# Patient Record
Sex: FEMALE
[De-identification: names, ages, dates, MRNs, and addresses within clinical notes are randomized per-mention and may not be internally consistent; named-entity substitution may affect disease eponyms.]

---

## 2018-03-01 ENCOUNTER — HOSPITAL ENCOUNTER (INPATIENT)
Dept: HOSPITAL 92 - ERS | Age: 63
LOS: 1 days | Discharge: HOME | DRG: 69 | End: 2018-03-02
Attending: FAMILY MEDICINE | Admitting: FAMILY MEDICINE
Payer: SELF-PAY

## 2018-03-01 VITALS — BODY MASS INDEX: 34.6 KG/M2

## 2018-03-01 DIAGNOSIS — E11.65: ICD-10-CM

## 2018-03-01 DIAGNOSIS — I10: ICD-10-CM

## 2018-03-01 DIAGNOSIS — G45.9: Primary | ICD-10-CM

## 2018-03-01 DIAGNOSIS — N39.0: ICD-10-CM

## 2018-03-01 DIAGNOSIS — J45.909: ICD-10-CM

## 2018-03-01 DIAGNOSIS — I69.354: ICD-10-CM

## 2018-03-01 PROCEDURE — 80048 BASIC METABOLIC PNL TOTAL CA: CPT

## 2018-03-01 PROCEDURE — 83036 HEMOGLOBIN GLYCOSYLATED A1C: CPT

## 2018-03-01 PROCEDURE — 36416 COLLJ CAPILLARY BLOOD SPEC: CPT

## 2018-03-01 PROCEDURE — 83605 ASSAY OF LACTIC ACID: CPT

## 2018-03-01 PROCEDURE — 36415 COLL VENOUS BLD VENIPUNCTURE: CPT

## 2018-03-01 PROCEDURE — 96365 THER/PROPH/DIAG IV INF INIT: CPT

## 2018-03-01 PROCEDURE — 80061 LIPID PANEL: CPT

## 2018-03-01 PROCEDURE — 93880 EXTRACRANIAL BILAT STUDY: CPT

## 2018-03-01 PROCEDURE — 85025 COMPLETE CBC W/AUTO DIFF WBC: CPT

## 2018-03-01 PROCEDURE — A4216 STERILE WATER/SALINE, 10 ML: HCPCS

## 2018-03-01 PROCEDURE — 70551 MRI BRAIN STEM W/O DYE: CPT

## 2018-03-01 RX ADMIN — Medication SCH ML: at 09:53

## 2018-03-01 RX ADMIN — Medication SCH ML: at 21:13

## 2018-03-01 NOTE — ULT
CAROTID ULTRASOUND WITH GRAY SCALE AND DOPPLER DUPLEX COLOR FLOW IMAGING

SPECTRAL ANALYSIS PERFORMED:

 

CLINICAL INDICATION: 

TIA.

 

FINDINGS: 

There is mild intimal thickening atherosclerotic calcification of the carotid arteries.

 

PEAK SYSTOLIC VELOCITY (CM/S):

Right CCA         75

Left CCA          93

Right ICA         86

Left ICA          59

 

There is antegrade flow within the visualized bilateral vertebral arteries.

 

IMPRESSION: 

1.  No hemodynamically significant stenosis of the right internal carotid artery.

2.  No hemodynamically significant stenosis of the left internal carotid artery.

 

POS: DANIKA

## 2018-03-01 NOTE — MRI
BRAIN MRI NONCONTRAST

3/1/18

 

CLINICAL HISTORY: 

TIA, CVA with left sided weakness. 

 

FINDINGS:  

There is no evidence of acute territorial infarction, mass effect or midline shift. Ventricular syste
m is within normal limits of size. There is mild chronic microvascular ischemic disease. No significa
nt intracranial hemorrhagic susceptibility. Imaged skull base flow voids are patent. There is opacifi
cation of the imaged maxillary sinuses. Partially empty sella is present. 

 

IMPRESSION:  

1.      No acute territorial infarction or mass effect. 

2.      Mild chronic microvascular ischemic disease. 

 

POS: DANIKA

## 2018-03-01 NOTE — PDOC.FPRHP
- History of Present Illness


Chief Complaint: Feels bad and worried about a stroke 


History of Present Illness: 





63 yo F with hx of HTN, DM, and CVA with persistent left sided weakness 

presented to [facility] ER with concern of "feeling bad and maybe had a stroke." 

She states that she started to feel bad and have a left frontal headache 

sometime in the afternoon of 2/28. She had associated left sided facial weakness

, drooling, and left UE weakness. It was at this point that she was concerned 

that she may be stroking. She then called her ex  who took her out to 

eat so that she might feel better. After returning home, her worry of stroke 

persisted and she decided to come into the ER.





She has an additional hx of DM, for which she takes metformin and glipizide and 

should be on insulin, however is not. 





This patient is a poor historian and typically answers questions tangential to 

what was asked. 


ED Course: 





In the ED she had a head CT that was negative. Additionally, she had a UA that 

was concerning for UTI. She was given a gram of Rocpehin and sent to this 

facility dt concern of TIA. 





- Allergies/Adverse Reactions


 Allergies











Allergy/AdvReac Type Severity Reaction Status Date / Time


 


codeine Allergy   Verified 03/01/18 04:47


 


iodine Allergy   Verified 03/01/18 04:48














- Home Medications


 











 Medication  Instructions  Recorded  Confirmed  Type


 


Amlodipine [Norvasc] 1 tab PO DAILY 03/01/18 03/01/18 History


 


Aspirin [Ecotrin Low Strength] 1 tab PO DAILY 03/01/18 03/01/18 History


 


glipiZIDE [Glipizide] 2 tab PO DAILY 03/01/18 03/01/18 History


 


metFORMIN [Glucophage] 1 tab PO BID 03/01/18 03/01/18 History














- History


PMHx:


CVA


HTN


DM2


Asthma


 


PSHx: 


Partial hysterectomy 2/2 cervical cancer 





FHx:


 


Social:


 








- Review of Systems


General: denies: fever/chills, weight/appetite/sleep changes


Eyes: denies: eye pain, vision changes


ENT: denies: nasal congestion


Respiratory: denies: cough, congestion, shortness of breath


Cardiovascular: denies: chest pain, palpitation


Gastrointestinal: denies: nausea, vomiting


Genitourinary: denies: incontinence, dysuria


Skin: denies: rashes, lesions


Musculoskeletal: denies: pain, tenderness, stiffness


Neurological: reports: numbness (Left face), weakness (Left arm), other (left 

frontal headache)


Psychological: denies: anxiety, depression





- Vital signs


BP: 173/93  HR: 82 RR: 20 Tmax: 97.9 Pox: 96% on RA  Wt: 93 kg   








- Physical Exam


Constitutional: NAD, awake, alert and oriented, well developed


HEENT: normocephalic and atraumatic, PERRLA, EOMI


Neck: supple, FROM


Chest: no-tender to palpation


Heart: RRR, normal S1/S2, no murmurs/rubs/gallops


Lungs: CTAB, no respiratory distress, no wheezing


Abdomen: soft, non-tender, bowel sounds present


Musculoskeletal: normal structure, normal tone


Neurological: CN II-XII intact, other (L UE  strength, wrist extension, arm 

flexion all 3/5)


Skin: no rash/lesions, good turgor


Heme/Lymphatic: no unusual bruising or bleeding


Psychiatric: normal mood and affect, intact recent and remote memory





FMR H&P: Results





- Labs


Lab results: 





Per [facility] records


Lactic Aci d2.3


UA Nit pos, LE post, WBC 20-50, Puneet many, squam 0-5





CBC Hb 14.3, Hct 41, WBC 7.6, Plt 221





FMR H&P: A/P





- Problem List


(1) TIA (transient ischemic attack)


Current Visit: Yes   Status: Suspected   Priority: High   





(2) HTN (hypertension)


Current Visit: Yes   Status: Chronic   Priority: Medium   Code(s): I10 - 

ESSENTIAL (PRIMARY) HYPERTENSION   


Qualifiers: 


   Hypertension type: essential hypertension   Qualified Code(s): I10 - 

Essential (primary) hypertension   





(3) DM2 (diabetes mellitus, type 2)


Current Visit: Yes   Status: Chronic   Priority: Medium   





(4) UTI (urinary tract infection)


Current Visit: Yes   Status: Acute   Priority: High   


Qualifiers: 


   Urinary tract infection type: acute cystitis   Hematuria presence: without 

hematuria   Qualified Code(s): N30.00 - Acute cystitis without hematuria   





- Plan





Possible TIA


- CT neg, however in light of hx will order MRI today 


- Carotid doppler 


- Continue ASA, pt unable to tolerate statin per hx


- Admit to Stroke, monitor NIH





UTI


- Continue Rocephin 


- Call [facility] for Cx resutls


- This is likely 2/2 elevated BG





DM2


- continue home meds


- SSI


- BG ACHS


- A1c


- Low carb diet





HTN


- continue home meds, pt states that at home she is typically controlled and 

reports BP 120s/80s





Asthma


- possible dx. Pt did not mention it and it is not in her meds from home, 

however it was in her hx from [facility] ED


- monitor O2 sats. PRN albuterol





DVT PPx


- SCD





Diet 


- Heart healthy, low carb














Disposition/LOS: 





Pt is stable. Continue to treat UTI and work up possible TIA/CVA. Likely 

discharge in 24 hours. 





FMR H&P: Upper Level





- Pertinent history





63yo F with PMHx of T2DM, HTN, hx of CVA (2017) and hx of AAA who presented to 

[facility] ED due to LT sided facial numbness, drooling, difficulty finding words 

and not feeling well. Transferred here for further evaluation. Patient reports 

she had sudden onset of LT frontal HA, LT facial numbness and drooling around 

3pm. Called her ex- at that time and decided to go out and eat dinner as 

she had just not been feeling well for the past 2wks. Went home and continued 

to have symptoms and thus presented to ED. States symptoms resolved when she 

got medicine at the ED. Only received insulin and rocephin at OSH ED. Patient 

is a very poor historian. Reports 2wk hx of increased urinary frequency and 

urinary pressure. Denies any fevers, chills, N/V, diarrhea. Of note, patient 

has a hx of CVA last year with LT sided deficits. Continues to have residual LT 

UE/LE weakness but currently states her LUE feels stiffer than her baseline. 





- Pertinent findings


Gen: NAD


HEENT: PERRLA


Heart: S1, S2, RRR


Lungs: CTAB


Abd: soft, +suprapubic tendnerness, BS+


Neuro: CN2-12 intact, RUE/RLE 5/5 strength, LUE/LLE 3/5 strength, no drift, 

sensation intact, normal FNF





- Plan


Date/Time: 03/01/18 0705








Orly NELSON, have evaluated this patient and agree with findings/

plan as outlined by intern resident. Pertinent changes/additions are listed 

here.





1. TIA vs. CVA: Admit to stroke. Cont ASA. Previously on statin but stopped 

because it shut down her organs. Obtain FLP, A1c, carotid dopplers and MRI. 

Allow for permissive HTN for 24hrs. ABCD2 score of 6 indicative of high 2d 

stroke. 


2. Elevated lactate: Patient with elevated lactate of 2.3 at OSH and given 1L 

bolus at that time. Cont IVF and repeat lactate. 


3. UTI: Given Rocephin at OSH. Cont. Urine and bl cxs obtained at OSH. 


4. Hyperglycemia: likely 2/2 uncontrolled T2DM. Given insulin at OSH. Obtain 

A1c. Currently on metformin and glipizide though not compliant. She recently 

got meds filled a few days ago, but prior had not been taking them for the past 

2wks because of financial costs. Cont home regimen.


5. HTN: hold home amlodipine. 


6. Hx of CVA with LT sided residual deficits (2017)


7. Hx of AAA


8. Diet: HH


9. PPx: SCDs


10. Code Status: Full 





Attending Addendum





- Attending Addendum


Date/Time: 03/01/18 1221





I personally evaluated the patient and discussed the management with Dr. Roche and Dr. Polk.


I agree with the History, Examination, Assessment and Plan documented above 

with any addition or exceptions noted below.


Patient with symptoms of TIA, now resolved with exception of headache and 

malaise.  Patient notes dysarthria, unilateral weakness on the left side (worse 

than residual hemiplegia from previous stroke), and left-sided facial droop 

that are all now resolved.  ABCD2 score 6, will need to stay overnight for 

observation.  MRI, carotid dopplar results pending.  Consult neurology and 

start ASA/Plavix DAPT for 21 days followed by Plavix treatment unless another 

treatment course is preferred by neurology for secondary stroke prevention.  

Does not tolerate statin.  In addition will treat UTI with Rocephin and await 

culture results.

## 2018-03-02 VITALS — SYSTOLIC BLOOD PRESSURE: 158 MMHG | TEMPERATURE: 97.7 F | DIASTOLIC BLOOD PRESSURE: 95 MMHG

## 2018-03-02 LAB
ANION GAP SERPL CALC-SCNC: 12 MMOL/L (ref 10–20)
BASOPHILS # BLD AUTO: 0 THOU/UL (ref 0–0.2)
BASOPHILS NFR BLD AUTO: 0.2 % (ref 0–1)
BUN SERPL-MCNC: 6 MG/DL (ref 9.8–20.1)
CALCIUM SERPL-MCNC: 9.3 MG/DL (ref 7.8–10.44)
CHD RISK SERPL-RTO: 6.2 (ref ?–4.5)
CHLORIDE SERPL-SCNC: 102 MMOL/L (ref 98–107)
CHOLEST SERPL-MCNC: 210 MG/DL
CO2 SERPL-SCNC: 25 MMOL/L (ref 23–31)
CREAT CL PREDICTED SERPL C-G-VRATE: 140 ML/MIN (ref 70–130)
EOSINOPHIL # BLD AUTO: 0.2 THOU/UL (ref 0–0.7)
EOSINOPHIL NFR BLD AUTO: 2.8 % (ref 0–10)
GLUCOSE SERPL-MCNC: 219 MG/DL (ref 80–115)
HDLC SERPL-MCNC: 34 MG/DL
HGB BLD-MCNC: 13.6 G/DL (ref 12–16)
LDLC SERPL CALC-MCNC: 142 MG/DL
LYMPHOCYTES # BLD: 1.6 THOU/UL (ref 1.2–3.4)
LYMPHOCYTES NFR BLD AUTO: 23.4 % (ref 21–51)
MCH RBC QN AUTO: 28.4 PG (ref 27–31)
MCV RBC AUTO: 84 FL (ref 81–99)
MONOCYTES # BLD AUTO: 0.5 THOU/UL (ref 0.11–0.59)
MONOCYTES NFR BLD AUTO: 7.1 % (ref 0–10)
NEUTROPHILS # BLD AUTO: 4.5 THOU/UL (ref 1.4–6.5)
NEUTROPHILS NFR BLD AUTO: 66.5 % (ref 42–75)
PLATELET # BLD AUTO: 201 THOU/UL (ref 130–400)
POTASSIUM SERPL-SCNC: 3.6 MMOL/L (ref 3.5–5.1)
RBC # BLD AUTO: 4.78 MILL/UL (ref 4.2–5.4)
SODIUM SERPL-SCNC: 135 MMOL/L (ref 136–145)
TRIGL SERPL-MCNC: 168 MG/DL (ref ?–150)
WBC # BLD AUTO: 6.8 THOU/UL (ref 4.8–10.8)

## 2018-03-02 NOTE — PDOC.FM
- Subjective


Subjective: 





Patient reports she is feeling much better this morning. She states she is back 

to her baseline walking. She reports feelings like her confusion is resolved. 

She would like to go home.





- Objective


Vital Signs & Weight: 


 Vital Signs (12 hours)











  Temp Pulse Resp BP Pulse Ox


 


 03/02/18 08:00  98.3 F  92  18  


 


 03/02/18 07:18  98.3 F  92  18  141/90 H  97


 


 03/02/18 04:00  97.5 F L  80  18  165/79 H  94 L








 Weight











Weight                         97.296 kg














I&O: 


 











 03/01/18 03/02/18 03/03/18





 06:59 06:59 06:59


 


Intake Total  0 


 


Balance  0 











Result Diagrams: 


 03/02/18 05:16





 03/02/18 05:16





<Homero Polk - Last Filed: 03/02/18 08:47>





- Objective


Vital Signs & Weight: 


 Weight











Weight                         97.296 kg














I&O: 


 











 03/02/18 03/03/18 03/04/18





 06:59 06:59 06:59


 


Intake Total 0  


 


Balance 0  











Result Diagrams: 


 03/02/18 05:16





 03/02/18 05:16





<Betty Orellana - Last Filed: 03/03/18 09:18>





Phys Exam





- Physical Examination


HEENT: PERRLA, moist MMs


Neck: no nodes, full ROM


Respiratory: no wheezing, clear to auscultation bilateral


Cardiovascular: RRR, no significant murmur


Gastrointestinal: soft, non-tender


Musculoskeletal: no edema, pulses present


Neurological: non-focal, moves all 4 limbs


Psychiatric: normal affect, A&O x 3


Skin: no rash, normal turgor, cap refill <2 seconds





<Homero Polk - Last Filed: 03/02/18 08:47>





Dx/Plan





- Plan


Plan: 





Possible TIA


- CT neg, however in light of hx


- Brain MRI shows chronic ischemis changes


- Carotid doppler negative


- Continue ASA


- add rosuvastatin, plavix





UTI


- Continue Rocephin 


- Called [facility] for Cx resutls


- This is likely 2/2 elevated BG





DM2


- continue home meds


- SSI


- BG ACHS


- A1c


- Low carb diet





HTN


- continue home meds, pt states that at home she is typically controlled and 

reports BP 120s/80s





Asthma


- monitor O2 sats. PRN albuterol





DVT PPx


- SCD





Diet 


- Heart healthy, low carb





Dispo: d/c today





<Homero Polk - Last Filed: 03/02/18 08:47>


(1) TIA (transient ischemic attack)


Status: Suspected   





(2) HTN (hypertension)


Code(s): I10 - ESSENTIAL (PRIMARY) HYPERTENSION   Status: Chronic   


Qualifiers: 


   Hypertension type: essential hypertension   Qualified Code(s): I10 - 

Essential (primary) hypertension   





(3) DM2 (diabetes mellitus, type 2)


Status: Chronic   





(4) UTI (urinary tract infection)


Status: Acute   


Qualifiers: 


   Urinary tract infection type: acute cystitis   Hematuria presence: without 

hematuria   Qualified Code(s): N30.00 - Acute cystitis without hematuria   





<Betty Orellana - Last Filed: 03/03/18 09:18>





Attending Addendum





- Attending Addendum


Date/Time: 03/03/18 0917





I personally evaluated the patient and discussed the management with Dr. Polk  

on 3/2/18.


I agree with the History, Examination, Assessment and Plan documented above 

with any addition or exceptions noted below.


Patient back to baseline.  Secondary stroke prevention initiated with ASA 81 mg 

and Plavix 75 mg for 90 days followed by either ASA or Plavix only given patient

's risk factors for bleeding.  Extensive counseling done by myself and Dr. Polk 

on lifestyle changes that will decrease her future stroke risk, dementia onset, 

diabetes, etc...  >30 minutes spent by myself on discharge.





<Betty Orellana - Last Filed: 03/03/18 09:18>

## 2018-03-03 NOTE — DIS-2
DATE OF ADMISSION:  03/01/2018

 

DATE OF DISCHARGE:  03/02/2018

 

RESIDENT:  Dr. Homero Polk.

 

ADMITTING ATTENDING:  Dr. Gopal Corona.

 

DISCHARGE ATTENDING:  Dr. Betty Orellana.

 

CONSULTATIONS:  Neurology.

 

PROCEDURES:  None.

 

PRIMARY DIAGNOSIS:  Transient ischemic attack.

 

SECONDARY DIAGNOSES:  Urinary tract infection, diabetes mellitus type 2, hypertension, asthma.

 

DISCHARGE MEDICATIONS:  Aspirin 81 mg, amoxicillin 500 mg b.i.d. for 5 days, Plavix 75 mg daily for 9
0 days, rosuvastatin 20 mg, glipizide 10 mg daily.

 

DISCONTINUED MEDICATIONS:  None.

 

HISTORY OF PRESENT ILLNESS AND HOSPITAL COURSE:  A 62-year-old female with history of hypertension, d
iabetes mellitus, and cerebrovascular accident with persistent left-sided weakness, presented to [facility] ER "feeling bad and maybe had a stroke."  She states that she started to feel bad sometimes befo
re noon that day and then called her ex- to go out for lunch.  She then decided to go into the
 ED for evaluation, because she noticed drooling on the left side.

 

During the course of hospitalization, the patient had a CT head which was read as negative.  She also
 had a brain MRI, which showed chronic ischemic changes.  She was treated for UTI based on her sympto
ms with Rocephin while in the hospital and discharged home on amoxicillin.  She is discharged home wi
 stroke optimization medications.  She should only be on the Plavix for 90 days following this hosp
italization in order to decrease bleeding risk.

 

DISPOSITION:  Stable.

 

DISCHARGE INSTRUCTIONS:

1.  Location:  Home.

2.  Diet:  Regular.

3.  Activity:  As tolerated.

4.  Followup:  Follow up with PCP within 7 days.